# Patient Record
Sex: FEMALE | Race: ASIAN | NOT HISPANIC OR LATINO | URBAN - METROPOLITAN AREA
[De-identification: names, ages, dates, MRNs, and addresses within clinical notes are randomized per-mention and may not be internally consistent; named-entity substitution may affect disease eponyms.]

---

## 2020-06-04 NOTE — PATIENT DISCUSSION
COUNSELING FOR PTOSIS:  I have talked with the patient about my impressions, explained our treatment plan, and have answered all questions. If patient desires surgery she will be referred to oculoplastics.

## 2020-06-04 NOTE — PATIENT DISCUSSION
GLAUCOMA: I have talked with the patient about my impressions, explained our treatment plan, and have answered all questions and patient understands. Patient to continue present medications. Patient to follow up in February for Julisa Villeda OCT.

## 2022-02-25 ENCOUNTER — NEW PATIENT (OUTPATIENT)
Dept: URBAN - METROPOLITAN AREA CLINIC 9 | Facility: CLINIC | Age: 72
End: 2022-02-25

## 2022-02-25 DIAGNOSIS — Z96.1: ICD-10-CM

## 2022-02-25 DIAGNOSIS — H02.831: ICD-10-CM

## 2022-02-25 DIAGNOSIS — H02.834: ICD-10-CM

## 2022-02-25 DIAGNOSIS — H04.123: ICD-10-CM

## 2022-02-25 DIAGNOSIS — H43.813: ICD-10-CM

## 2022-02-25 PROCEDURE — 92134 CPTRZ OPH DX IMG PST SGM RTA: CPT

## 2022-02-25 PROCEDURE — 92015 DETERMINE REFRACTIVE STATE: CPT

## 2022-02-25 PROCEDURE — 92004 COMPRE OPH EXAM NEW PT 1/>: CPT

## 2022-02-25 ASSESSMENT — VISUAL ACUITY
OS_CC: J5
OD_CC: J3
OS_SC: 20/20-2
OS_CC: 20/25-1
OU_CC: J3
OD_CC: 20/25-1
OD_SC: 20/30

## 2022-02-25 ASSESSMENT — TONOMETRY
OD_IOP_MMHG: 11
OS_IOP_MMHG: 12

## 2022-03-25 ENCOUNTER — FOLLOW UP (OUTPATIENT)
Dept: URBAN - METROPOLITAN AREA CLINIC 9 | Facility: CLINIC | Age: 72
End: 2022-03-25

## 2022-03-25 DIAGNOSIS — H02.831: ICD-10-CM

## 2022-03-25 DIAGNOSIS — H04.123: ICD-10-CM

## 2022-03-25 DIAGNOSIS — H02.834: ICD-10-CM

## 2022-03-25 DIAGNOSIS — Z96.1: ICD-10-CM

## 2022-03-25 DIAGNOSIS — H43.813: ICD-10-CM

## 2022-03-25 PROCEDURE — 99213 OFFICE O/P EST LOW 20 MIN: CPT

## 2022-03-25 ASSESSMENT — TONOMETRY
OS_IOP_MMHG: 15
OD_IOP_MMHG: 12

## 2022-09-19 ENCOUNTER — ESTABLISHED PATIENT (OUTPATIENT)
Dept: URBAN - METROPOLITAN AREA CLINIC 9 | Facility: CLINIC | Age: 72
End: 2022-09-19

## 2022-09-19 DIAGNOSIS — H04.123: ICD-10-CM

## 2022-09-19 DIAGNOSIS — H02.222: ICD-10-CM

## 2022-09-19 DIAGNOSIS — H52.4: ICD-10-CM

## 2022-09-19 DIAGNOSIS — H02.225: ICD-10-CM

## 2022-09-19 DIAGNOSIS — Z96.1: ICD-10-CM

## 2022-09-19 DIAGNOSIS — H16.213: ICD-10-CM

## 2022-09-19 PROCEDURE — 92015 DETERMINE REFRACTIVE STATE: CPT

## 2022-09-19 PROCEDURE — 92014 COMPRE OPH EXAM EST PT 1/>: CPT

## 2022-09-19 ASSESSMENT — KERATOMETRY
OD_K2POWER_DIOPTERS: 44.00
OD_AXISANGLE2_DEGREES: 97
OD_AXISANGLE_DEGREES: 007
OS_AXISANGLE2_DEGREES: 115
OS_AXISANGLE_DEGREES: 025
OS_K2POWER_DIOPTERS: 44.25
OD_K1POWER_DIOPTERS: 43.50
OS_K1POWER_DIOPTERS: 43.50

## 2022-09-19 ASSESSMENT — TONOMETRY
OS_IOP_MMHG: 10
OD_IOP_MMHG: 10

## 2022-09-19 ASSESSMENT — VISUAL ACUITY
OS_CC: J2
OD_CC: 20/40+1
OD_CC: J2
OS_CC: 20/30+1
OU_CC: J1
OU_CC: 20/30+1

## 2022-12-06 NOTE — PATIENT DISCUSSION
IOP is above target. I think it is time to consider intervention. OU. She could have SLT OU - I do not think she has had this or she could have a RAINA along with cataract surgery.

## 2022-12-06 NOTE — PATIENT DISCUSSION
Patient would like to have a surgical consult with Dr. Janelle Mosley for evaluation. She has appt with him.

## 2022-12-06 NOTE — PATIENT DISCUSSION
IOP is elevated today - she has been out of Lumigan for a month - samples given of Vyzulta (I don't have Lumigan).